# Patient Record
Sex: MALE | Race: WHITE | Employment: PART TIME | URBAN - NONMETROPOLITAN AREA
[De-identification: names, ages, dates, MRNs, and addresses within clinical notes are randomized per-mention and may not be internally consistent; named-entity substitution may affect disease eponyms.]

---

## 2017-01-20 ENCOUNTER — TELEPHONE (OUTPATIENT)
Dept: RADIATION ONCOLOGY | Facility: HOSPITAL | Age: 72
End: 2017-01-20

## 2017-01-20 DIAGNOSIS — Z12.5 SPECIAL SCREENING FOR MALIGNANT NEOPLASM OF PROSTATE: Primary | ICD-10-CM

## 2017-01-23 ENCOUNTER — AMBULATORY - GICH (OUTPATIENT)
Dept: SCHEDULING | Facility: OTHER | Age: 72
End: 2017-01-23

## 2017-01-23 ENCOUNTER — ONCOLOGY VISIT (OUTPATIENT)
Dept: RADIATION ONCOLOGY | Facility: HOSPITAL | Age: 72
End: 2017-01-23
Attending: RADIOLOGY
Payer: COMMERCIAL

## 2017-01-23 VITALS
HEART RATE: 64 BPM | RESPIRATION RATE: 16 BRPM | DIASTOLIC BLOOD PRESSURE: 70 MMHG | SYSTOLIC BLOOD PRESSURE: 132 MMHG | WEIGHT: 175 LBS

## 2017-01-23 DIAGNOSIS — Z12.5 SPECIAL SCREENING FOR MALIGNANT NEOPLASM OF PROSTATE: ICD-10-CM

## 2017-01-23 DIAGNOSIS — C61 MALIGNANT NEOPLASM OF PROSTATE (H): Primary | ICD-10-CM

## 2017-01-23 LAB — PSA SERPL-ACNC: 0.01 UG/L (ref 0–4)

## 2017-01-23 PROCEDURE — 99211 OFF/OP EST MAY X REQ PHY/QHP: CPT | Performed by: RADIOLOGY

## 2017-01-23 PROCEDURE — 36415 COLL VENOUS BLD VENIPUNCTURE: CPT | Performed by: RADIOLOGY

## 2017-01-23 PROCEDURE — G0103 PSA SCREENING: HCPCS | Performed by: RADIOLOGY

## 2017-01-23 PROCEDURE — 84153 ASSAY OF PSA TOTAL: CPT | Performed by: RADIOLOGY

## 2017-01-23 ASSESSMENT — PAIN SCALES - GENERAL: PAINLEVEL: NO PAIN (0)

## 2017-01-23 NOTE — PROGRESS NOTES
RADIATON THERAPY FOLLOW-UP NOTE      REFERRING PROVIDER:  Robin Posada MD; KEITH Zambrano      DIAGNOSIS:  Carol 3+3 adenocarcinoma of the prostate, originally stage T3a N0 M0.  Prostatectomy 10/2011, treated for presumptive local recurrence with a PSA of 0.2 (2013).      RADIATION RX:  The patient completed radiation therapy on 2014, 7700 cGy total dose to the prostatic fossa.      SUBJECTIVE:  Mr. Otero returns for followup today feeling very well.  He has actually had multiple coronary stents put in since his last follow-up.  He had no symptoms as far as he knew of ischemic coronary disease but seems to be doing well.      OBJECTIVE:   VITAL SIGNS:  Weight 175 pounds.  Blood pressure 132/70, heart rate 64.   HEENT:  Extraocular movements full.  Pupils are equal, round, reactive.  Face symmetric.  Palate and tongue, midline and symmetric.   NECK:  No cervical or supraclavicular lymphadenopathy.   LUNGS:  Clear to auscultation.   HEART:  Cardiac exam  reveals regular rate and rhythm without murmurs or extra sounds.  No axillary adenopathy.   ABDOMEN:  Nontender, without appreciable organomegaly.  No inguinal lymphadenopathy.   SKELETAL:  No axial skeletal tenderness.   EXTREMITIES:  Extremity strength is intact.  Deep tendon reflexes, symmetric.      LABORATORY DATA:  Today's PSA returned at 0.01.      IMPRESSION:  Excellent clinical recovery.      COMMENTS:  Mr. Otero claims to have rare nocturia, no ongoing symptoms of proctitis or diarrhea. He has had an acceptable PSA dhaval, an excellent clinical recovery.  Hopefully, his coronary issues will be well taken care of with his stenting.      PLAN:  Continue six-month surveillance for his prostate carcinoma.         MICHELE RUSSELL MD             D: 2017 10:53   T: 2017 11:11   MT: CD      Name:     LANDEN OTERO   MRN:      [id number]        Account:      [id number]   :      1945           Service Date: 2017       Document: [id number]       cc: Justine Posada MD

## 2017-01-23 NOTE — MR AVS SNAPSHOT
"              After Visit Summary   1/23/2017    Slim Lang    MRN: [id number]           Patient Information     Date Of Birth          1945        Visit Information        Provider Department      1/23/2017 10:30 AM Scar Forbes MD [facility]        Today's Diagnoses     Malignant neoplasm of prostate (H)    -  1     Special screening for malignant neoplasm of prostate            Follow-ups after your visit        Your next 10 appointments already scheduled     Jan 23, 2017 10:30 AM   Follow Up with Scar Forbes MD   [facility] ([facility] )    [address] [place] [id number] [phone]              Who to contact     If you have questions or need follow up information about today's clinic visit or your schedule please contact [facility] directly at [phone].  Normal or non-critical lab and imaging results will be communicated to you by MyChart, letter or phone within 4 business days after the clinic has received the results. If you do not hear from us within 7 days, please contact the clinic through MyChart or phone. If you have a critical or abnormal lab result, we will notify you by phone as soon as possible.  Submit refill requests through PacketSled or call your pharmacy and they will forward the refill request to us. Please allow 3 business days for your refill to be completed.          Additional Information About Your Visit        BrookstoneharFin Quiver Information     PacketSled lets you send messages to your doctor, view your test results, renew your prescriptions, schedule appointments and more. To sign up, go to [email or web address] . Click on \"Log in\" on the left side of the screen, which will take you to the Welcome page. Then click on \"Sign up Now\" on the right side of the page.     You will be asked to enter the access code listed below, as well as some personal information. Please follow the directions to create your username and " password.     Your access code is: [email or web address]  Expires: 2017 10:19 AM     Your access code will  in 90 days. If you need help or a new code, please call your [facility] or [phone].        Care EveryWhere ID     This is your Care EveryWhere ID. This could be used by other organizations to access your [facility] medical records  [id number]        Your Vitals Were     Pulse Respirations                64 16           Blood Pressure from Last 3 Encounters:   17 132/70   16 118/72   16 100/62    Weight from Last 3 Encounters:   17 79.379 kg (175 lb)   16 81.058 kg (178 lb 11.2 oz)   16 83.054 kg (183 lb 1.6 oz)              We Performed the Following     Prostate spec antigen screen        Primary Care Provider Office Phone # Fax #    Robin Posada [phone]       [facility] [address] [facility] [address]        Thank you!     Thank you for choosing [facility]  for your care. Our goal is always to provide you with excellent care. Hearing back from our patients is one way we can continue to improve our services. Please take a few minutes to complete the written survey that you may receive in the mail after your visit with us. Thank you!             Your Updated Medication List - Protect others around you: Learn how to safely use, store and throw away your medicines at www.disposemymeds.org.          This list is accurate as of: 17 10:19 AM.  Always use your most recent med list.                   Brand Name Dispense Instructions for use    ASPIRIN PO      Take 81 mg by mouth daily       BRILINTA PO      Take 90 mg by mouth 2 times daily       Co Q 10 10 MG Caps      Take by mouth daily       FLAXSEED OIL PO      Take by mouth daily       METOPROLOL TARTRATE PO      Take 25 mg by mouth 2 times daily       NIACIN FLUSH FREE PO      Take by mouth daily       NITROSTAT SL      Place 0.4 mg under the  tongue every 5 minutes as needed for chest pain       simvastatin 20 MG tablet    ZOCOR     Take by mouth At Bedtime

## 2017-01-23 NOTE — PROGRESS NOTES
FOLLOW-UP VISIT    Patient Name: Slim Lang      : 1945     Age: 71 year old        ______________________________________________________________________________     Chief Complaint   Patient presents with     Radiation Therapy     /70 mmHg  Pulse 64  Resp 16  Wt 79.379 kg (175 lb)     Date Radiation Completed: 14    Pain  Denies    Labs  Other Labs: N/A    Imaging  None        PSA:   PSA   Date Value Ref Range Status   2017 0.01 0 - 4 ug/L Final     Comment:     Assay Method:  Chemiluminescence using Siemens Vista analyzer   2016 <0.01 0 - 4 ug/L Final   2016 <0.01 0 - 4 ug/L Final   2015 0.02 0 - 4 ug/L Final   2015 0.04 0 - 4 ug/L Final   2014 <0.13 0 - 4 ug/L Final     Testosterone Level: No results found for: TESTOSTTOTAL    On-Study AUA Symptom Score (PQ)       Diarrhea: 0- None    Constipation: 0- None    Nocturia:   x0-1/night    Dysuria: 0-none    Hematuria: 0-none    Patient was assessed using the NCCN psychosocial distress thermometer. Patient rated the score as a 0. Patient rated current stressors as n/a. Stressors will be brought to the attention of provider or Oncology RN Care Coordinator for a score of 6 or greater or per nurses discretion.       Pt is here for a routine follow up.  PSA was drawn in the room by the lab staff.  Pt has no complaints or concerns regarding his prostate cancer.  He does report that he recently had 5 stents placed in his heart. He has undergone cardiac rehab and appears to be doing well in that regard.  No other concerns at this time.    ROLevel 1- Verification of admission data and rooming completed.  Necessary information gathered and reported to provider, time spent coordinating patient needs is 15 minutes.

## 2017-08-01 ENCOUNTER — ONCOLOGY VISIT (OUTPATIENT)
Dept: RADIATION ONCOLOGY | Facility: HOSPITAL | Age: 72
End: 2017-08-01
Attending: RADIOLOGY
Payer: COMMERCIAL

## 2017-08-01 VITALS
DIASTOLIC BLOOD PRESSURE: 78 MMHG | HEART RATE: 64 BPM | WEIGHT: 168.7 LBS | SYSTOLIC BLOOD PRESSURE: 158 MMHG | RESPIRATION RATE: 16 BRPM

## 2017-08-01 DIAGNOSIS — Z12.5 SPECIAL SCREENING FOR MALIGNANT NEOPLASM OF PROSTATE: ICD-10-CM

## 2017-08-01 DIAGNOSIS — C61 MALIGNANT NEOPLASM OF PROSTATE (H): Primary | ICD-10-CM

## 2017-08-01 LAB — PSA SERPL-ACNC: NORMAL UG/L (ref 0–4)

## 2017-08-01 PROCEDURE — G0103 PSA SCREENING: HCPCS | Performed by: RADIOLOGY

## 2017-08-01 PROCEDURE — 99211 OFF/OP EST MAY X REQ PHY/QHP: CPT | Performed by: RADIOLOGY

## 2017-08-01 PROCEDURE — 36415 COLL VENOUS BLD VENIPUNCTURE: CPT | Performed by: RADIOLOGY

## 2017-08-01 PROCEDURE — 84153 ASSAY OF PSA TOTAL: CPT | Performed by: RADIOLOGY

## 2017-08-01 ASSESSMENT — PAIN SCALES - GENERAL: PAINLEVEL: NO PAIN (0)

## 2017-08-01 NOTE — MR AVS SNAPSHOT
"              After Visit Summary   8/1/2017    Slim Lang    MRN: [id number]           Patient Information     Date Of Birth          1945        Visit Information        Provider Department      8/1/2017 10:00 AM Scar Forbes MD [facility]        Today's Diagnoses     Malignant neoplasm of prostate (H)    -  1    Special screening for malignant neoplasm of prostate           Follow-ups after your visit        Your next 10 appointments already scheduled     Feb 05, 2018 10:00 AM CST   Follow Up with Scar Forbes MD   [facility] ([facility] )    [address] [place] [address] [phone]              Who to contact     If you have questions or need follow up information about today's clinic visit or your schedule please contact [facility] directly at [phone].  Normal or non-critical lab and imaging results will be communicated to you by MyChart, letter or phone within 4 business days after the clinic has received the results. If you do not hear from us within 7 days, please contact the clinic through NightstaRxhart or phone. If you have a critical or abnormal lab result, we will notify you by phone as soon as possible.  Submit refill requests through OrthoFi or call your pharmacy and they will forward the refill request to us. Please allow 3 business days for your refill to be completed.          Additional Information About Your Visit        NightstaRxhart Information     OrthoFi lets you send messages to your doctor, view your test results, renew your prescriptions, schedule appointments and more. To sign up, go to [email or web address] . Click on \"Log in\" on the left side of the screen, which will take you to the Welcome page. Then click on \"Sign up Now\" on the right side of the page.     You will be asked to enter the access code listed below, as well as some personal information. Please follow the directions to create your username and " password.     Your access code is: [email or web address]  Expires: 10/30/2017 11:33 AM     Your access code will  in 90 days. If you need help or a new code, please call your [facility] clinic or [phone].        Care EveryWhere ID     This is your Care EveryWhere ID. This could be used by other organizations to access your [facility] medical records  [id number]        Your Vitals Were     Pulse Respirations                64 16           Blood Pressure from Last 3 Encounters:   17 158/78   17 132/70   16 118/72    Weight from Last 3 Encounters:   17 76.5 kg (168 lb 11.2 oz)   17 79.4 kg (175 lb)   16 81.1 kg (178 lb 11.2 oz)              We Performed the Following     Prostate spec antigen screen        Primary Care Provider Office Phone # Fax #    Robin Posada [phone]       [facility] [address] [facility] [address]        Equal Access to Services     [facility]: Hadii aad ku hadasho Soomaali, waaxda luqadaha, qaybta kaalmada adeblanca, katlyn parnell . So [facility] [phone].    ATENCIÓN: Si habla español, tiene a kauffman disposición servicios gratuitos de asistencia lingüística. [facility] [phone].    We comply with applicable federal civil rights laws and [place] laws. We do not discriminate on the basis of race, color, national origin, age, disability sex, sexual orientation or gender identity.            Thank you!     Thank you for choosing [facility]  for your care. Our goal is always to provide you with excellent care. Hearing back from our patients is one way we can continue to improve our services. Please take a few minutes to complete the written survey that you may receive in the mail after your visit with us. Thank you!             Your Updated Medication List - Protect others around you: Learn how to safely use, store and throw away your medicines at www.disposemymeds.org.           This list is accurate as of: 8/1/17 11:33 AM.  Always use your most recent med list.                   Brand Name Dispense Instructions for use Diagnosis    ASPIRIN PO      Take 81 mg by mouth daily        BRILINTA PO      Take 90 mg by mouth 2 times daily        Co Q 10 10 MG Caps      Take by mouth daily        FLAXSEED OIL PO      Take by mouth daily        METOPROLOL TARTRATE PO      Take 25 mg by mouth 2 times daily        NIACIN FLUSH FREE PO      Take by mouth daily        NITROSTAT SL      Place 0.4 mg under the tongue every 5 minutes as needed for chest pain        simvastatin 20 MG tablet    ZOCOR     Take by mouth At Bedtime

## 2017-08-01 NOTE — PROGRESS NOTES
FOLLOW-UP VISIT    Patient Name: Slim Lang      : 1945     Age: 72 year old        ______________________________________________________________________________     Chief Complaint   Patient presents with     Radiation Therapy     /78 (BP Location: Right arm, Patient Position: Chair, Cuff Size: Adult Regular)  Pulse 64  Resp 16  Wt 76.5 kg (168 lb 11.2 oz)     Date Radiation Completed: 14    Pain  Denies    Labs  Other Labs: No    Imaging  None        PSA:   PSA   Date Value Ref Range Status   2017  0 - 4 ug/L Final    <0.01  Assay Method:  Chemiluminescence using Siemens Vista analyzer     2017 0.01 0 - 4 ug/L Final     Comment:     Assay Method:  Chemiluminescence using Siemens Vista analyzer   2016 <0.01 0 - 4 ug/L Final   2016 <0.01 0 - 4 ug/L Final   2015 0.02 0 - 4 ug/L Final   2015 0.04 0 - 4 ug/L Final   2014 <0.13 0 - 4 ug/L Final     Testosterone Level: No results found for: TESTOSTTOTAL    On-Study AUA Symptom Score (PQ)       Diarrhea: 0- None    Constipation: 0- None    Nocturia: x0-1/ night    Dysuria: -none    Hematuria: -none    Pt is here for a routine follow up.  PSA was drawn in the room by the lab staff.  Pt is doing well and has no complaints or concerns at this time.     ROLevel 1- Verification of admission data and rooming completed.  Necessary information gathered and reported to provider, time spent coordinating patient needs is 15 minutes.    Marie Grigsby RN

## 2017-08-04 NOTE — PROGRESS NOTES
REFERRING:  Robin Posada MD, Justine Zhang, FNP    [place] 3+3 adenocarcinoma of the prostate.  Originally stage T3a N0 M0, prostatectomy 10/2011 treated for presumptive local recurrence with a PSA of 0.2 (12/03/2013).    RADIATION THERAPY:  The patient completed radiation therapy, 7700 cGy total dose to the prostatic fossa on 04/24/2014.  The patient returns for followup today feeling very well.  At last visit, he had had multiple coronary stents put in which so far are functioning well.  He had a recent stress EKG.  In terms of urinary tract he is fine.  He has nocturia only if he wakes up at night.  He does not wake up specifically because of a full bladder.  No ongoing symptoms of proctitis or diarrhea.    OBJECTIVE:  Weight 168.7 pounds, blood pressure 150/78, heart rate 64. Extraocular movements full.  Pupils equal, round, reactive.  Face symmetric.  Palate and tongue midline and symmetric.  No cervical or supraclavicular lymphadenopathy.  Lungs clear to auscultation.  Cardiac exam reveals regular rate and rhythm without murmurs or extra sounds.  No axillary adenopathy.  Abdomen is nontender without appreciable organomegaly.  No inguinal lymphadenopathy.  Extremity strength is intact.  Deep tendon reflexes symmetric.  Today's PSA returned at less than 0.01.    IMPRESSION:  No clinical or PSA evidence of recurrence or progression following potential salvage treatment for prostate carcinoma in a postoperative setting.  Continue 6-month surveillance.        Scar Forbes MD    D:  08/01/2017 11:44 T:  08/01/2017 16:10  Document:  [id number] \\BR

## 2018-02-05 ENCOUNTER — DOCUMENTATION ONLY (OUTPATIENT)
Dept: FAMILY MEDICINE | Facility: OTHER | Age: 73
End: 2018-02-05

## 2018-02-12 DIAGNOSIS — Z12.5 SPECIAL SCREENING FOR MALIGNANT NEOPLASM OF PROSTATE: Primary | ICD-10-CM

## 2018-02-13 ENCOUNTER — ONCOLOGY VISIT (OUTPATIENT)
Dept: RADIATION ONCOLOGY | Facility: HOSPITAL | Age: 73
End: 2018-02-13
Attending: RADIOLOGY
Payer: COMMERCIAL

## 2018-02-13 VITALS
HEART RATE: 68 BPM | BODY MASS INDEX: 26.78 KG/M2 | RESPIRATION RATE: 16 BRPM | DIASTOLIC BLOOD PRESSURE: 70 MMHG | SYSTOLIC BLOOD PRESSURE: 134 MMHG | WEIGHT: 180 LBS

## 2018-02-13 DIAGNOSIS — Z12.5 SPECIAL SCREENING FOR MALIGNANT NEOPLASM OF PROSTATE: ICD-10-CM

## 2018-02-13 DIAGNOSIS — C61 MALIGNANT NEOPLASM OF PROSTATE (H): Primary | ICD-10-CM

## 2018-02-13 LAB — PSA SERPL-ACNC: <0.01 UG/L (ref 0–4)

## 2018-02-13 PROCEDURE — 36415 COLL VENOUS BLD VENIPUNCTURE: CPT | Performed by: RADIOLOGY

## 2018-02-13 PROCEDURE — G0463 HOSPITAL OUTPT CLINIC VISIT: HCPCS | Performed by: RADIOLOGY

## 2018-02-13 PROCEDURE — G0103 PSA SCREENING: HCPCS | Performed by: RADIOLOGY

## 2018-02-13 PROCEDURE — 84153 ASSAY OF PSA TOTAL: CPT | Performed by: RADIOLOGY

## 2018-02-13 ASSESSMENT — PAIN SCALES - GENERAL: PAINLEVEL: NO PAIN (0)

## 2018-02-13 NOTE — PROGRESS NOTES
Service Date: 2018      RADIATION THERAPY PROGRESS NOTE      REFERRING PHYSICIANS:  Robin Posada MD; KEITH Zambrano      DIAGNOSIS:  Carol 3+3 adenocarcinoma of the prostate, originally stage T3a N0 M0.  Prostatectomy 10/2011, treated for presumptive local recurrence with a PSA of 0.2  (2013.      RADIATION RX:  The patient completed radiation therapy, 7700 cGy total dose to the prostatic fossa on 2014.      SUBJECTIVE:  The patient returns for followup today, feeling very well.  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      OBJECTIVE:   VITAL SIGNS:  Weight 179.8 pounds.  Blood pressure 134/70, heart rate 68.   HEENT:  Extraocular movements, full.  Pupils are equal, round, reactive.  Face, symmetric.  Palate and tongue, midline and symmetric.   NECK:  No cervical or supraclavicular lymphadenopathy.   LUNGS:  Clear to auscultation.   HEART:  Cardiac exam reveals regular rate and rhythm without murmurs or extra sounds.  No axillary lymphadenopathy.   ABDOMEN:  Nontender, without appreciable organomegaly.  No inguinal lymphadenopathy.   EXTREMITIES:  Strength is intact.  Deep tendon reflexes, symmetric.      LABORATORY DATA:  Today's PSA returned at less than 0.01, or below assay sensitivity level.      IMPRESSION:  No clinical or PSA evidence of recurrence or progression and excellent recovery with rare nocturia.  No ongoing symptoms of proctitis or diarrhea.      PLAN:  Continue six-month surveillance.         MICHELE RUSSELL MD             D: 2018   T: 2018   MT: ALICIA      Name:     LANDEN OTERO   MRN:      [id number]        Account:      [id number]   :      1945           Service Date: 2018      Document: [id number]

## 2018-02-13 NOTE — MR AVS SNAPSHOT
"              After Visit Summary   2/13/2018    Slim Lang    MRN: [id number]           Patient Information     Date Of Birth          1945        Visit Information        Provider Department      2/13/2018 10:00 AM Scar Forbes MD [facility]        Today's Diagnoses     Malignant neoplasm of prostate (H)    -  1    Special screening for malignant neoplasm of prostate           Follow-ups after your visit        Your next 10 appointments already scheduled     Aug 13, 2018 10:00 AM CDT   Follow Up with Scar Forbes MD   [facility] ([facility] )    [address] [place] [id number] [phone]              Future tests that were ordered for you today     Open Standing Orders        Priority Remaining Interval Expires Ordered    Prostate spec antigen screen STAT 2/3  2/12/2019 2/12/2018            Who to contact     If you have questions or need follow up information about today's clinic visit or your schedule please contact [facility] directly at [phone].  Normal or non-critical lab and imaging results will be communicated to you by Homecare Homebasehart, letter or phone within 4 business days after the clinic has received the results. If you do not hear from us within 7 days, please contact the clinic through Homecare Homebasehart or phone. If you have a critical or abnormal lab result, we will notify you by phone as soon as possible.  Submit refill requests through Scurri or call your pharmacy and they will forward the refill request to us. Please allow 3 business days for your refill to be completed.          Additional Information About Your Visit        Homecare HomebaseharGarmor Information     Scurri lets you send messages to your doctor, view your test results, renew your prescriptions, schedule appointments and more. To sign up, go to [email or web address] . Click on \"Log in\" on the left side of the screen, which will take you to the Welcome page. Then click on \"Sign up " "Now\" on the right side of the page.     You will be asked to enter the access code listed below, as well as some personal information. Please follow the directions to create your username and password.     Your access code is: [email or web address]  Expires: 2018 11:25 AM     Your access code will  in 90 days. If you need help or a new code, please call your [facility] clinic or [phone].        Care EveryWhere ID     This is your Care EveryWhere ID. This could be used by other organizations to access your [facility] medical records  [id number]        Your Vitals Were     Pulse Respirations BMI (Body Mass Index)             68 16 26.78 kg/m2          Blood Pressure from Last 3 Encounters:   18 134/70   17 158/78   17 132/70    Weight from Last 3 Encounters:   18 81.6 kg (180 lb)   17 76.5 kg (168 lb 11.2 oz)   17 79.4 kg (175 lb)              We Performed the Following     Prostate spec antigen screen        Primary Care Provider Office Phone # Fax #    Robin Posada [phone]       [address] [place] [address]        Equal Access to Services     [facility] : Hadii marisol ku hadasho Soomaali, waaxda luqadaha, qaybta kaalmada adeegyada, katlyn parnell . So [facility] [phone].    ATENCIÓN: Si habla español, tiene a kauffman disposición servicios gratuitos de asistencia lingüística. Llame al [phone].    We comply with applicable federal civil rights laws and [place] laws. We do not discriminate on the basis of race, color, national origin, age, disability, sex, sexual orientation, or gender identity.            Thank you!     Thank you for choosing [facility]  for your care. Our goal is always to provide you with excellent care. Hearing back from our patients is one way we can continue to improve our services. Please take a few minutes to complete the written survey that you may receive in the mail after your visit " with us. Thank you!             Your Updated Medication List - Protect others around you: Learn how to safely use, store and throw away your medicines at www.disposemymeds.org.          This list is accurate as of 2/13/18 11:25 AM.  Always use your most recent med list.                   Brand Name Dispense Instructions for use Diagnosis    ASPIRIN PO      Take 81 mg by mouth daily        BRILINTA PO      Take 90 mg by mouth 2 times daily        Co Q 10 10 MG Caps      Take by mouth daily        FLAXSEED OIL PO      Take by mouth daily        METOPROLOL TARTRATE PO      Take 25 mg by mouth 2 times daily        NIACIN FLUSH FREE PO      Take by mouth daily        NITROSTAT SL      Place 0.4 mg under the tongue every 5 minutes as needed for chest pain        simvastatin 20 MG tablet    ZOCOR     Take by mouth At Bedtime

## 2018-02-13 NOTE — PROGRESS NOTES
FOLLOW-UP VISIT    Patient Name: Slim Lang      : 1945     Age: 72 year old        ______________________________________________________________________________     Chief Complaint   Patient presents with     Radiation Therapy     /70 (BP Location: Left arm, Patient Position: Chair, Cuff Size: Adult Regular)  Pulse 68  Resp 16  Wt 81.6 kg (180 lb)  BMI 26.78 kg/m2     Date Radiation Completed: 14    Pain  Denies    Labs  Other Labs: N/A    Imaging  None        PSA:   PSA   Date Value Ref Range Status   2018 <0.01 0 - 4 ug/L Final     Comment:     Assay Method:  Chemiluminescence using Siemens Vista analyzer   2017  0 - 4 ug/L Final    <0.01  Assay Method:  Chemiluminescence using Siemens Vista analyzer     2017 0.01 0 - 4 ug/L Final     Comment:     Assay Method:  Chemiluminescence using Siemens Vista analyzer   2016 <0.01 0 - 4 ug/L Final   2016 <0.01 0 - 4 ug/L Final   2015 0.02 0 - 4 ug/L Final   2015 0.04 0 - 4 ug/L Final   2014 <0.13 0 - 4 ug/L Final     Testosterone Level: No results found for: TESTOSTTOTAL    On-Study AUA Symptom Score (PQ)       Diarrhea: 0- None    Constipation: 0- None    Nocturia: x 0-1/night    Dysuria: 0-none    Hematuria: 0-none    Pt is here for a routine follow up after EBRT tx for prostate cancer.  PSA was drawn in the room.  Pt has no complaints and is feeling well today.     Marie Grigsby RN

## 2018-08-13 ENCOUNTER — ONCOLOGY VISIT (OUTPATIENT)
Dept: RADIATION ONCOLOGY | Facility: HOSPITAL | Age: 73
End: 2018-08-13
Attending: RADIOLOGY
Payer: COMMERCIAL

## 2018-08-13 VITALS
DIASTOLIC BLOOD PRESSURE: 70 MMHG | BODY MASS INDEX: 25.73 KG/M2 | WEIGHT: 173 LBS | HEART RATE: 60 BPM | RESPIRATION RATE: 16 BRPM | SYSTOLIC BLOOD PRESSURE: 136 MMHG

## 2018-08-13 DIAGNOSIS — Z12.5 SPECIAL SCREENING FOR MALIGNANT NEOPLASM OF PROSTATE: ICD-10-CM

## 2018-08-13 DIAGNOSIS — C61 MALIGNANT NEOPLASM OF PROSTATE (H): Primary | ICD-10-CM

## 2018-08-13 LAB — PSA SERPL-ACNC: <0.01 UG/L (ref 0–4)

## 2018-08-13 PROCEDURE — 84153 ASSAY OF PSA TOTAL: CPT | Performed by: RADIOLOGY

## 2018-08-13 PROCEDURE — G0463 HOSPITAL OUTPT CLINIC VISIT: HCPCS

## 2018-08-13 PROCEDURE — G0103 PSA SCREENING: HCPCS | Performed by: RADIOLOGY

## 2018-08-13 PROCEDURE — 36415 COLL VENOUS BLD VENIPUNCTURE: CPT | Performed by: RADIOLOGY

## 2018-08-13 ASSESSMENT — PAIN SCALES - GENERAL: PAINLEVEL: NO PAIN (0)

## 2018-08-13 NOTE — MR AVS SNAPSHOT
"              After Visit Summary   8/13/2018    Slim Lang    MRN: [id number]           Patient Information     Date Of Birth          1945        Visit Information        Provider Department      8/13/2018 10:00 AM Scar Forbes MD [facility]        Today's Diagnoses     Malignant neoplasm of prostate (H)    -  1    Special screening for malignant neoplasm of prostate           Follow-ups after your visit        Your next 10 appointments already scheduled     Feb 11, 2019 10:00 AM CST   Follow Up with Scar Forbes MD   [facility] ([facility] )    [address] [place] [id number] [phone]              Who to contact     If you have questions or need follow up information about today's clinic visit or your schedule please contact [facility] directly at [phone].  Normal or non-critical lab and imaging results will be communicated to you by Lumafithart, letter or phone within 4 business days after the clinic has received the results. If you do not hear from us within 7 days, please contact the clinic through Lumafithart or phone. If you have a critical or abnormal lab result, we will notify you by phone as soon as possible.  Submit refill requests through Gamervision or call your pharmacy and they will forward the refill request to us. Please allow 3 business days for your refill to be completed.          Additional Information About Your Visit        Lumafithart Information     Gamervision lets you send messages to your doctor, view your test results, renew your prescriptions, schedule appointments and more. To sign up, go to [email or web address] . Click on \"Log in\" on the left side of the screen, which will take you to the Welcome page. Then click on \"Sign up Now\" on the right side of the page.     You will be asked to enter the access code listed below, as well as some personal information. Please follow the directions to create your username and " password.     Your access code is: [email or web address]  Expires: 2018 11:56 AM     Your access code will  in 90 days. If you need help or a new code, please call your [facility] clinic or [phone].        Care EveryWhere ID     This is your Care EveryWhere ID. This could be used by other organizations to access your [facility] medical records  [id number]        Your Vitals Were     Pulse Respirations BMI (Body Mass Index)             60 16 25.73 kg/m2          Blood Pressure from Last 3 Encounters:   18 136/70   18 134/70   17 158/78    Weight from Last 3 Encounters:   18 78.5 kg (173 lb)   18 81.6 kg (180 lb)   17 76.5 kg (168 lb 11.2 oz)              We Performed the Following     Prostate spec antigen screen        Primary Care Provider Office Phone # Fax #    Robin Posada [phone]       [address] [facility] [address]        Equal Access to Services     [facility]: Hadii aad ku hadasho Soomaali, waaxda luqadaha, qaybta kaalmada adeblanca, katlyn parnell . So [facility] [phone].    ATENCIÓN: Si habla español, tiene a kauffman disposición servicios gratuitos de asistencia lingüística. [facility] [phone].    We comply with applicable federal civil rights laws and [place] laws. We do not discriminate on the basis of race, color, national origin, age, disability, sex, sexual orientation, or gender identity.            Thank you!     Thank you for choosing [facility]  for your care. Our goal is always to provide you with excellent care. Hearing back from our patients is one way we can continue to improve our services. Please take a few minutes to complete the written survey that you may receive in the mail after your visit with us. Thank you!             Your Updated Medication List - Protect others around you: Learn how to safely use, store and throw away your medicines at www.disposemymeds.org.           This list is accurate as of 8/13/18 11:56 AM.  Always use your most recent med list.                   Brand Name Dispense Instructions for use Diagnosis    ASPIRIN PO      Take 81 mg by mouth daily        BRILINTA PO      Take 90 mg by mouth 2 times daily        Co Q 10 10 MG Caps      Take by mouth daily        FLAXSEED OIL PO      Take by mouth daily        METOPROLOL TARTRATE PO      Take 25 mg by mouth 2 times daily        NIACIN FLUSH FREE PO      Take by mouth daily        NITROSTAT SL      Place 0.4 mg under the tongue every 5 minutes as needed for chest pain        simvastatin 20 MG tablet    ZOCOR     Take by mouth At Bedtime

## 2018-08-13 NOTE — PROGRESS NOTES
Service Date: 2018      RADIATION THERAPY FOLLOWUP NOTE      REFERRING PROVIDERS:  Robin Posada MD; KEITH Zambrano.      DIAGNOSIS:  Carol 3+3 adenocarcinoma of the prostate.  Originally stage T3a N0 M0.  Prostatectomy 10/2011, treated for presumptive local recurrence.  7700 cGy completed 2014.  Pretreatment PSA of 0.2.      SUBJECTIVE:  The patient is doing fine, has rare nocturia.  Good continence.  No ongoing symptoms of proctitis or diarrhea.  Remains fully active.      OBJECTIVE:   VITAL SIGNS:  Weight 172.8 pounds, blood pressure 136/70, heart rate 60.   HEENT:  Extraocular movements full.  Pupils are equal, round, reactive.  Face is symmetric.  Palate and tongue midline and symmetric.   NECK:  No cervical or supraclavicular lymphadenopathy.   LUNGS:  Clear to auscultation.   HEART:  Cardiac exam reveals regular rate and rhythm without murmurs or extra sounds.   ABDOMEN:  Nontender without appreciable organomegaly.  No inguinal lymphadenopathy.   EXTREMITIES:  Strength is intact.  Deep tendon reflexes symmetric.      LABORATORY DATA:  Today's PSA returned at less than 0.01.      IMPRESSION:  Excellent clinical recovery and PSA dhaval following radiation therapy for salvage of prostate carcinoma in a postoperative setting.      PLAN:  Continue a 6-month surveillance.         MICHELE RUSSELL MD             D: 2018   T: 2018   MT: EMERSON      Name:     LANDEN OTERO   MRN:      [id number]        Account:      [id number]   :      1945           Service Date: 2018      Document: [id number]       cc: Justine Posada MD

## 2018-08-13 NOTE — PROGRESS NOTES
FOLLOW-UP VISIT    Patient Name: Slim Lang      : 1945     Age: 73 year old        ______________________________________________________________________________     Chief Complaint   Patient presents with     Radiation Therapy     /70 (BP Location: Right arm, Patient Position: Chair, Cuff Size: Adult Regular)  Pulse 60  Resp 16  Wt 78.5 kg (173 lb)  BMI 25.73 kg/m2     Date Radiation Completed: 14    Pain  Denies    Labs  Other Labs: No    Imaging  None        PSA:   PSA   Date Value Ref Range Status   2018 <0.01 0 - 4 ug/L Final     Comment:     Assay Method:  Chemiluminescence using Siemens Vista analyzer   2018 <0.01 0 - 4 ug/L Final     Comment:     Assay Method:  Chemiluminescence using Siemens Vista analyzer   2017  0 - 4 ug/L Final    <0.01  Assay Method:  Chemiluminescence using Siemens Vista analyzer     2017 0.01 0 - 4 ug/L Final     Comment:     Assay Method:  Chemiluminescence using Siemens Vista analyzer   2016 <0.01 0 - 4 ug/L Final   2016 <0.01 0 - 4 ug/L Final   2015 0.02 0 - 4 ug/L Final   2015 0.04 0 - 4 ug/L Final   2014 <0.13 0 - 4 ug/L Final     Testosterone Level: No results found for: TESTOSTTOTAL    On-Study AUA Symptom Score (PQ)       Diarrhea: 0- None    Constipation: 0- None    Nocturia: 0-none    Dysuria: 0-none    Hematuria: 0-none      Pt is here for a routine follow up after completion of EBRT for prostate cancer.  PSA was drawn in the room by the lab staff. Results noted above. Pt has no other concerns or complaints.   Marie Grigsby RN

## 2019-02-11 ENCOUNTER — ONCOLOGY VISIT (OUTPATIENT)
Dept: RADIATION ONCOLOGY | Facility: HOSPITAL | Age: 74
End: 2019-02-11
Attending: RADIOLOGY
Payer: COMMERCIAL

## 2019-02-11 VITALS
DIASTOLIC BLOOD PRESSURE: 72 MMHG | WEIGHT: 174 LBS | BODY MASS INDEX: 25.88 KG/M2 | SYSTOLIC BLOOD PRESSURE: 124 MMHG | HEART RATE: 64 BPM | RESPIRATION RATE: 16 BRPM

## 2019-02-11 DIAGNOSIS — C61 MALIGNANT NEOPLASM OF PROSTATE (H): Primary | ICD-10-CM

## 2019-02-11 DIAGNOSIS — Z12.5 SPECIAL SCREENING FOR MALIGNANT NEOPLASM OF PROSTATE: ICD-10-CM

## 2019-02-11 LAB — PSA SERPL-ACNC: <0.01 UG/L (ref 0–4)

## 2019-02-11 PROCEDURE — 36415 COLL VENOUS BLD VENIPUNCTURE: CPT | Performed by: RADIOLOGY

## 2019-02-11 PROCEDURE — G0463 HOSPITAL OUTPT CLINIC VISIT: HCPCS

## 2019-02-11 PROCEDURE — 84153 ASSAY OF PSA TOTAL: CPT | Performed by: RADIOLOGY

## 2019-02-11 PROCEDURE — G0103 PSA SCREENING: HCPCS | Performed by: RADIOLOGY

## 2019-02-11 ASSESSMENT — PAIN SCALES - GENERAL: PAINLEVEL: NO PAIN (0)

## 2019-02-11 NOTE — PROGRESS NOTES
Service Date: 2019      RADIATION THERAPY FOLLOWUP NOTE      REFERRING PROVIDERS:  Robin Posada MD; KEITH Zambrano.      DIAGNOSIS:  Carol 3+3 adenocarcinoma of the prostate.  Originally stage T3a N0 M0 (surgical) prostatectomy 10/2011, treated for presumptive local recurrence 7700 cGy completed 2014.  Pretreatment PSA of 0.2.      SUBJECTIVE:  The patient is doing just fine.  Only occasional nocturia.  Good continence.  No ongoing symptoms of proctitis or diarrhea.  Remains fully active.      OBJECTIVE:   VITAL SIGNS:  Weight 174.7 pounds, blood pressure 124/72, heart rate 64.   HEENT:  Extraocular movements, full.  Pupils are equal, round, reactive.  Face, symmetric.  Palate and tongue, midline and symmetric.   NECK:  No cervical or supraclavicular lymphadenopathy.   LUNGS:  Clear to auscultation.   HEART:  Cardiac exam reveals regular rate and rhythm without murmurs or extra sounds.   ABDOMEN:  Nontender, without appreciable organomegaly.  No inguinal lymphadenopathy.   EXTREMITIES:  Extremity strength is intact.  Deep tendon reflexes, symmetric.      LABORATORY DATA:  Today's PSA returned at less than 0.01.      IMPRESSION:  Excellent clinical recovery and PSA response in the setting of salvage radiation therapy after prostatectomy.      PLAN:  Continue follow up in 1 year.         MICHELE RUSSELL MD             D: 2019   T: 2019   MT: JANKI      Name:     LANDEN OTERO   MRN:      [id number]        Account:      [id number]   :      1945           Service Date: 2019      Document: [id number]       cc: Justine Posada MD

## 2019-02-11 NOTE — PROGRESS NOTES
FOLLOW-UP VISIT    Patient Name: Slim Lang      : 1945     Age: 73 year old        ______________________________________________________________________________     Chief Complaint   Patient presents with     Radiation Therapy     /72 (BP Location: Left arm, Patient Position: Chair, Cuff Size: Adult Regular)   Pulse 64   Resp 16   Wt 78.9 kg (174 lb)   BMI 25.88 kg/m       Date Radiation Completed: 14    Pain  Denies    Labs  Other Labs: Yes: see below    Imaging  None        PSA:   PSA   Date Value Ref Range Status   2019 <0.01 0 - 4 ug/L Final     Comment:     Assay Method:  Chemiluminescence using Siemens Vista analyzer   2018 <0.01 0 - 4 ug/L Final     Comment:     Assay Method:  Chemiluminescence using Siemens Vista analyzer   2018 <0.01 0 - 4 ug/L Final     Comment:     Assay Method:  Chemiluminescence using Siemens Vista analyzer   2017  0 - 4 ug/L Final    <0.01  Assay Method:  Chemiluminescence using Siemens Vista analyzer     2017 0.01 0 - 4 ug/L Final     Comment:     Assay Method:  Chemiluminescence using Siemens Vista analyzer   2016 <0.01 0 - 4 ug/L Final   2016 <0.01 0 - 4 ug/L Final   2015 0.02 0 - 4 ug/L Final   2015 0.04 0 - 4 ug/L Final   2014 <0.13 0 - 4 ug/L Final     Testosterone Level: No results found for: TESTOSTTOTAL    On-Study AUA Symptom Score (PQ)       Diarrhea: 0- None    Constipation: 0- None    Nocturia x 0-1    Here for routine follow up for prostate cancer.  PSA drawn and Dr. Forbes informed pt of results.      Sarah Grimaldo RN

## 2020-02-10 ENCOUNTER — ONCOLOGY VISIT (OUTPATIENT)
Dept: RADIATION ONCOLOGY | Facility: HOSPITAL | Age: 75
End: 2020-02-10
Attending: RADIOLOGY
Payer: COMMERCIAL

## 2020-02-10 VITALS
WEIGHT: 178.9 LBS | HEART RATE: 64 BPM | RESPIRATION RATE: 16 BRPM | SYSTOLIC BLOOD PRESSURE: 124 MMHG | BODY MASS INDEX: 26.61 KG/M2 | DIASTOLIC BLOOD PRESSURE: 76 MMHG

## 2020-02-10 DIAGNOSIS — Z12.5 SPECIAL SCREENING FOR MALIGNANT NEOPLASM OF PROSTATE: Primary | ICD-10-CM

## 2020-02-10 LAB — PSA SERPL-ACNC: <0.01 UG/L (ref 0–4)

## 2020-02-10 PROCEDURE — G0103 PSA SCREENING: HCPCS | Performed by: RADIOLOGY

## 2020-02-10 PROCEDURE — 84153 ASSAY OF PSA TOTAL: CPT | Performed by: RADIOLOGY

## 2020-02-10 PROCEDURE — G0463 HOSPITAL OUTPT CLINIC VISIT: HCPCS | Performed by: RADIOLOGY

## 2020-02-10 PROCEDURE — 36415 COLL VENOUS BLD VENIPUNCTURE: CPT | Performed by: RADIOLOGY

## 2020-02-10 PROCEDURE — 99213 OFFICE O/P EST LOW 20 MIN: CPT | Performed by: RADIOLOGY

## 2020-02-10 RX ORDER — PRAMIPEXOLE DIHYDROCHLORIDE 0.25 MG/1
TABLET ORAL
COMMUNITY
Start: 2020-01-24

## 2020-02-10 RX ORDER — PRAMIPEXOLE DIHYDROCHLORIDE 0.12 MG/1
TABLET ORAL
COMMUNITY
Start: 2019-07-25

## 2020-02-10 ASSESSMENT — PAIN SCALES - GENERAL: PAINLEVEL: NO PAIN (0)

## 2020-02-10 NOTE — PROGRESS NOTES
FOLLOW-UP VISIT    Patient Name: Slim Lang      : 1945     Age: 74 year old        ______________________________________________________________________  Chief Complaint   Patient presents with     Radiation Therapy     /76 (BP Location: Right arm, Patient Position: Chair, Cuff Size: Adult Regular)   Pulse 64   Resp 16   Wt 81.1 kg (178 lb 14.4 oz)   BMI 26.61 kg/m       Date Radiation Completed: 24    Pain  Denies    Labs  Other Labs: N/A    Imaging  None      PSA:   PSA   Date Value Ref Range Status   02/10/2020 <0.01 0 - 4 ug/L Final     Comment:     Assay Method:  Chemiluminescence using Siemens Vista analyzer   2019 <0.01 0 - 4 ug/L Final     Comment:     Assay Method:  Chemiluminescence using Siemens Vista analyzer   2018 <0.01 0 - 4 ug/L Final     Comment:     Assay Method:  Chemiluminescence using Siemens Vista analyzer   2018 <0.01 0 - 4 ug/L Final     Comment:     Assay Method:  Chemiluminescence using Siemens Vista analyzer   2017  0 - 4 ug/L Final    <0.01  Assay Method:  Chemiluminescence using Siemens Vista analyzer     2017 0.01 0 - 4 ug/L Final     Comment:     Assay Method:  Chemiluminescence using Siemens Vista analyzer   2016 <0.01 0 - 4 ug/L Final   2016 <0.01 0 - 4 ug/L Final   2015 0.02 0 - 4 ug/L Final   2015 0.04 0 - 4 ug/L Final     Testosterone Level: No results found for: TESTOSTTOTAL    On-Study AUA Symptom Score (PQ)       Diarrhea: 0- None    Constipation: 0- None    Nocturia :0 - None    Dysuria:0 - None    Hematuria:0 - No     Pt is here for a routine follow up after completion of EBRT for prostate cancer.  PSA was drawn in the room by the lab staff. Results noted above. Pt has no other concerns or complaints.       Nurse face-to-face time: Level 3:  10 min face to face time    Crystol Caudullo, RN

## 2020-02-10 NOTE — PROGRESS NOTES
[facility]    Radiation Oncology Progress Note    Date of Service (when I saw the patient): 02/10/2020     Slim Lang is a 74 year old male with prostate cancer.    Primary Care Physician   Iker Bagley      RADIATION TX: 7700 cGy completed 04/24/2014    Patient Active Problem List    Diagnosis Date Noted     Personal history of malignant neoplasm of prostate 12/11/2012     Priority: Medium     Malignant neoplasm of prostate (H) 10/03/2011     Priority: Medium       Interval History   No intervening health issues in the past year    Subjective   He feels well with no new complaints. He has nocturia 0-1 per night. He has no bowel complaints. PSA today <0.01.       Objective   /76 (BP Location: Right arm, Patient Position: Chair, Cuff Size: Adult Regular)   Pulse 64   Resp 16   Wt 81.1 kg (178 lb 14.4 oz)   BMI 26.61 kg/m    Wt Readings from Last 4 Encounters:   02/10/20 81.1 kg (178 lb 14.4 oz)   02/11/19 78.9 kg (174 lb)   08/13/18 78.5 kg (173 lb)   02/13/18 81.6 kg (180 lb)   General: He walks into the clinic unaccompanied.  ENT: Oral cavity and visible portion of the oropharynx without visible lesions.  He has no palpable neck adenopathy.  Chest: Clear to auscultation anterior and posterior.  Heart: S1 and S2 with regular rhythm  Abdomen: No distention, tenderness, or palpable masses.  Extremities: No edema or lesions noted.  Neuro: No lateralizing cranial nerve, motor, or gait deficits.     Medications   Current Outpatient Medications   Medication     ASPIRIN PO     Coenzyme Q10 (CO Q 10) 10 MG CAPS     Flaxseed, Linseed, (FLAXSEED OIL PO)     Inositol Niacinate (NIACIN FLUSH FREE PO)     METOPROLOL TARTRATE PO     Nitroglycerin (NITROSTAT SL)     pramipexole (MIRAPEX) 0.125 MG tablet     pramipexole (MIRAPEX) 0.25 MG tablet     simvastatin (ZOCOR) 20 MG tablet     No current facility-administered medications for this visit.        IMPRESSION:  75 yo man with Carol 3+3 adenocarcinoma of  the prostate.  Originally stage T3a N0 M0 (surgical) prostatectomy 10/2011, treated for presumptive local recurrence 7700 cGy completed 04/24/2014.  Pretreatment PSA of 0.2.    PSA today continues to be less than 0.01.     PLAN: He is doing well almost 6 years following radiation therapy to the bed of prostate.  His PSA continues to be less than 0.01.  We will make arrangements to see him for follow-up with a repeat PSA in 1 year and asked him to contact us before then if he has questions.    Slim Ramirez MD

## 2021-03-01 ENCOUNTER — ONCOLOGY VISIT (OUTPATIENT)
Dept: RADIATION ONCOLOGY | Facility: HOSPITAL | Age: 76
End: 2021-03-01
Attending: RADIOLOGY
Payer: COMMERCIAL

## 2021-03-01 VITALS
BODY MASS INDEX: 26.23 KG/M2 | TEMPERATURE: 96.8 F | HEART RATE: 60 BPM | RESPIRATION RATE: 16 BRPM | OXYGEN SATURATION: 98 % | SYSTOLIC BLOOD PRESSURE: 140 MMHG | WEIGHT: 177.1 LBS | DIASTOLIC BLOOD PRESSURE: 80 MMHG | HEIGHT: 69 IN

## 2021-03-01 DIAGNOSIS — C61 MALIGNANT NEOPLASM OF PROSTATE (H): Primary | ICD-10-CM

## 2021-03-01 DIAGNOSIS — Z12.5 SPECIAL SCREENING FOR MALIGNANT NEOPLASM OF PROSTATE: ICD-10-CM

## 2021-03-01 LAB — PSA SERPL-ACNC: <0.01 UG/L (ref 0–4)

## 2021-03-01 PROCEDURE — G0463 HOSPITAL OUTPT CLINIC VISIT: HCPCS | Performed by: RADIOLOGY

## 2021-03-01 PROCEDURE — 99212 OFFICE O/P EST SF 10 MIN: CPT | Performed by: RADIOLOGY

## 2021-03-01 PROCEDURE — G0103 PSA SCREENING: HCPCS | Performed by: RADIOLOGY

## 2021-03-01 PROCEDURE — 84153 ASSAY OF PSA TOTAL: CPT | Performed by: RADIOLOGY

## 2021-03-01 PROCEDURE — 36415 COLL VENOUS BLD VENIPUNCTURE: CPT | Performed by: RADIOLOGY

## 2021-03-01 ASSESSMENT — PAIN SCALES - GENERAL: PAINLEVEL: NO PAIN (0)

## 2021-03-01 ASSESSMENT — MIFFLIN-ST. JEOR: SCORE: 1533.7

## 2021-03-01 NOTE — NURSING NOTE
"No chief complaint on file.      Initial BP (!) 140/80   Pulse 60   Temp 96.8  F (36  C) (Tympanic)   Resp 16   Wt 80.3 kg (177 lb 1.6 oz)   SpO2 98%   BMI 26.34 kg/m   Estimated body mass index is 26.34 kg/m  as calculated from the following:    Height as of 12/17/13: 1.746 m (5' 8.75\").    Weight as of this encounter: 80.3 kg (177 lb 1.6 oz).  Medication Reconciliation: complete  Maritza Esteban RN    "

## 2021-03-01 NOTE — PROGRESS NOTES
[facility]  DEPARTMENT OF RADIATION ONCOLOGY  FOLLOW-UP NOTE    Name: Slim Lang MRN: [id number]   : 1945 (75 year old)  Date of Service: Mar 1, 2021 Referring: Dr. Posada       Diagnosis and Cancer Staging  Malignant neoplasm of prostate (H)  Staging form: Prostate, AJCC 7th Edition  - Pathologic stage from 10/1/2011: Stage III (T3a, N0, cM0, PSA: Less than 10, Carol <= 6) - Signed by Tru Merritt MD on 3/1/2021      ADDENDUM: Today's level remains <0.01. Remains clinically without evidence of disease. Continue to recommend follow up with his primary care physician and to see us as needed.    Summary of Problems   The patient is a very pleasant 75 year old gentleman who returns for routine follow-up after salvage radiation therapy for biochemical failure of prostate cancer. On 10/19/2011, the patient underwent a radical prostatectomy with posterior bladder neck resection and sampling of the bilateral obturator nodes. The pathology yielded pT3aN0 disease with [place] grade 3+3 (6) caner on the left side with extraprostatic extension including a positive margins at the left anterior base and left posterior regions (the seminal vesicles and lymph nodes were negative). The PSA fell from a preoperative level of 15 to a postoperative dhaval that was below measurable levels. A subsequent rise to 0.02 led to salvage radiation therapy to the prostate fossa. The patient received a total dose of 7,700 cGy in 41 fractions (180 and 200 cGy) ending 2014. Levels have remained below measurable levels since at least 2016.        Recent History   The patient returns for routine follow-up prostate cancer. He continues to feel well and offers no new complaints. He enjoys a high quality of life. He feels that he has no toxicity from radiation therapy. He reports good urinary and rectal functions. He wakes occasionally at night to urinate. He has daily bowel movements. He denies urinary or rectal  "leakage or incontinence. He denies hematuria or blood per rectum. He denies bony pain. He denies unintentional weight loss.    Past Medical History:   Diagnosis Date     Prostate cancer (H)      Past Surgical History:   Procedure Laterality Date     STENT, CORONARY, MAT  2016    x5     Current Outpatient Medications   Medication Instructions     ASPIRIN PO 81 mg, Oral, DAILY     Coenzyme Q10 (CO Q 10) 10 MG CAPS DAILY     Flaxseed, Linseed, (FLAXSEED OIL PO) DAILY     Inositol Niacinate (NIACIN FLUSH FREE PO) DAILY     METOPROLOL TARTRATE PO 25 mg, Oral, 2 TIMES DAILY     Nitroglycerin (NITROSTAT SL) 0.4 mg, Sublingual, EVERY 5 MIN PRN     pramipexole (MIRAPEX) 0.125 MG tablet No dose, route, or frequency recorded.     pramipexole (MIRAPEX) 0.25 MG tablet No dose, route, or frequency recorded.     simvastatin (ZOCOR) 20 MG tablet AT BEDTIME     Allergies: Patient has no known allergies.    Social History     Tobacco Use     Smoking status: Never Smoker     Smokeless tobacco: Never Used   Substance Use Topics     Alcohol use: Yes     Alcohol/week: 0.0 standard drinks     Comment: occasionally     Drug use: None     Family History   Problem Relation Age of Onset     Cancer No family hx of      Physical Exam   KPS: 80.  Vitals: BP (!) 140/80   Pulse 60   Temp 96.8  F (36  C) (Tympanic)   Resp 16   Ht 1.753 m (5' 9\")   Wt 80.3 kg (177 lb 1.6 oz)   SpO2 98%   BMI 26.15 kg/m      Clinical Examination:  General: Appears clinically stable. Appears well. Overweight (BMI 25-29). Appears slightly fatigued. Appears stated age. Appears in good general health, well-developed, well-nourished, and in no acute distress. Does not appear acutely or chronically ill. Appears well groomed.  Head: Normocephalic.  Eyes: Anicteric, eyelids symmetric, vision grossly intact.  ENT: Good volume. No hoarseness.  Neck: Soft, supple, symmetric, trachea midline.  Lymphatics: No parotid, peripartoid, cervical, or supraclavicular " adenopathy.  Chest/Breasts:  No port. No implanted cardiac device.   Lungs: Easy respirations. No use of accessory musculature. Clear to ausculation.  Cardiovascular: No jugulovenous distension. RRR, S1, S2.  Abdomen: Soft, nondistended, nontender.  Pelvis: Soft, nondistended, nontender.  MSK: No arm edema or hand edema. Good muscle tone. Good posture. No tenderness on palpation. No cords or calf tenderness.  Integument: No jaundice or pallor.   Neurologic: Alert, oriented, fluent. Memory grossly intact. Motor grossly intact. Sensory grossly intact.  Psychologic: Pleasant, cooperative, insightful, concrete, and good judgment.    Information Review   I reviewed the case with the patient, evaluated him, and discussed his treatment options and risks of therapy. I reviewed the medical records, radiographic information, and pathologic studies. I reviewed the imaging studies via PACS. I reviewed the nursing and patient intake sheets. I offered to speak with his family members and friends today by speakerphone. The patient declined my offer but granted me permission to speak with them if they contact me or come to clinic. The patient is a good historian, began the visit with good insights into the disease process, and acknowledged the potentially poor consequences of his disease. He demonstrated good comprehension of our discussion and explored the treatment options with good understanding. The patient asked pertinent questions and insightful follow-up questions. The patient granted me permission to exchange medical information and records with other physicians. No therapeutic radiation protocols for the patient's disease are available at our Center.    Assessment/Plan   The patient remains clinically without evidence of prostate cancer after salvage pelvic radiation therapy in 2014 for a biochemical failure after radical prostatectomy in 2011. Given his excellent prognosis for prostate cancer, I counseled the patient that  that by feel comfortable with his primary care physician monitoring his clinical status. If desired, that service can obtain a yearly PSA level (they should anticipate an occasional inability measurable level due to the inaccuracy of the PSA assay system rather than the presence of recurrent/metastatic disease). The patient can continue to see urology as needed and can return to my clinic as desired. He wished to proceed as recommended. We answered all questions to his satisfaction. Thank you for allowing us to participate in the care of this very pleasant patient.      Tru Merritt M.D., Ph.D.  Department of Radiation Oncology  Tel: [phone]  Fax: [phone]    cc:  Robin Posada M.D., Pharm. D. (urology)  Justine Zhang N.P. (medicine)